# Patient Record
Sex: FEMALE | Race: WHITE | NOT HISPANIC OR LATINO | Employment: FULL TIME | URBAN - METROPOLITAN AREA
[De-identification: names, ages, dates, MRNs, and addresses within clinical notes are randomized per-mention and may not be internally consistent; named-entity substitution may affect disease eponyms.]

---

## 2022-05-27 ENCOUNTER — TELEPHONE (OUTPATIENT)
Dept: GENETICS | Facility: CLINIC | Age: 47
End: 2022-05-27
Payer: COMMERCIAL

## 2022-05-27 NOTE — TELEPHONE ENCOUNTER
Spoke to pt, scheduled appt with GC. Pt confirmed understanding of time, date and address of clinic

## 2022-06-15 ENCOUNTER — TELEPHONE (OUTPATIENT)
Dept: GENETICS | Facility: CLINIC | Age: 47
End: 2022-06-15
Payer: COMMERCIAL

## 2022-06-16 ENCOUNTER — OFFICE VISIT (OUTPATIENT)
Dept: GENETICS | Facility: CLINIC | Age: 47
End: 2022-06-16
Payer: COMMERCIAL

## 2022-06-16 ENCOUNTER — LAB VISIT (OUTPATIENT)
Dept: LAB | Facility: HOSPITAL | Age: 47
End: 2022-06-16
Attending: MEDICAL GENETICS
Payer: COMMERCIAL

## 2022-06-16 VITALS — WEIGHT: 177.81 LBS | HEIGHT: 67 IN | BODY MASS INDEX: 27.91 KG/M2

## 2022-06-16 DIAGNOSIS — Z82.79 FAMILY HISTORY OF CONGENITAL OR GENETIC CONDITION: Primary | ICD-10-CM

## 2022-06-16 DIAGNOSIS — Z82.79 FAMILY HISTORY OF CONGENITAL OR GENETIC CONDITION: ICD-10-CM

## 2022-06-16 PROCEDURE — 99499 NO LOS: ICD-10-PCS | Mod: S$GLB,,, | Performed by: MEDICAL GENETICS

## 2022-06-16 PROCEDURE — 96040 PR GENETIC COUNSELING, EACH 30 MIN: ICD-10-PCS | Mod: S$GLB,,, | Performed by: MEDICAL GENETICS

## 2022-06-16 PROCEDURE — 99999 PR PBB SHADOW E&M-EST. PATIENT-LVL III: CPT | Mod: PBBFAC,,,

## 2022-06-16 PROCEDURE — 36415 COLL VENOUS BLD VENIPUNCTURE: CPT | Performed by: MEDICAL GENETICS

## 2022-06-16 PROCEDURE — 99999 PR PBB SHADOW E&M-EST. PATIENT-LVL III: ICD-10-PCS | Mod: PBBFAC,,,

## 2022-06-16 PROCEDURE — 96040 PR GENETIC COUNSELING, EACH 30 MIN: CPT | Mod: S$GLB,,, | Performed by: MEDICAL GENETICS

## 2022-06-16 PROCEDURE — 99499 UNLISTED E&M SERVICE: CPT | Mod: S$GLB,,, | Performed by: MEDICAL GENETICS

## 2022-06-16 RX ORDER — SIMVASTATIN 10 MG/1
TABLET, FILM COATED ORAL
COMMUNITY
Start: 2022-04-19

## 2022-06-16 RX ORDER — DEXTROAMPHETAMINE SULFATE, DEXTROAMPHETAMINE SACCHARATE, AMPHETAMINE ASPARTATE MONOHYDRATE, AND AMPHETAMINE SULFATE 6.25; 6.25; 6.25; 6.25 MG/1; MG/1; MG/1; MG/1
1 CAPSULE, EXTENDED RELEASE ORAL DAILY
COMMUNITY
Start: 2022-05-30

## 2022-06-16 NOTE — PROGRESS NOTES
Sarah Watson   DOS: 2022  : 1975   MRN: [id number]     REFERRING MD: Self-referral     REASON FOR CONSULT: Our [facility] Service was asked to provide genetic counseling for this 46-year-old female with a family history of ALS and a pathogenic variant in [id number]. She presents with her  for todays visit.     HISTORY OF PRESENT ILLNESS: Ms. Watson is a 46-year-old female with no symptoms of ALS or frontotemporal dementia. Her sister, Lita is known to our service. 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     FAMILY HISTORY:   Ms. Watson has two sons ages 13 and 7 and a 17-year-old daughter. Her sister, Lita, has ALS. She is 51. Her mother is 80 and has no symptoms. Her father is 82 and may have early signs of dementia but this is unclear. Her paternal cousin had ALS and  in his 60s.         IMPRESSION: Ms. Watson is a 46-year-old female with a family history of ALS and a pathogenic variant in J9jgm14 for which she is at risk. We discussed that pathogenic repeat expansions in Q3nos06 are associated with frontotemporal dementia (FTD) and/or amyotrophic lateral sclerosis (ALS). Age of onset is usually between 50 and 64 years and phenotype, which may be pure FTD, pure ALS, or a combination, is highly variable, even within the same family. It is expected to be fully penetrant, but age of onset is unpredictable.     We discussed motivations for testing. She would like to know to know implications for her children and to plan for the future. She would also be interested in clinical trials if they became available. We discussed that while most cases of ALS related to Y5mta31 are inherited from an affected parent, her parents are both in their 80s. It is unclear if her father's early dementia is age-related or due to [id number]-bvwpdfw FTD. It is possible that Lita's mutation could be de pal or expanded due  to anticipation, but without testing her parents, this would be unclear.     We reviewed the implications of CATH[place] which protects from discrimination from health insurance companies and employers but not from life insurance or long-term disability. She consented to testing. I offered to arrange for counseling or to speak with a  if desired but this was declined. We discussed that generally we like to disclose results in person but a virtual visit would be OK in this case since she lives far away. She plans to have her  with her. We will arrange for follow-up once results are received.      RECOMMENDATIONS:  1. T8vcv05 repeat analysis to [facility]  2. Follow-up once results are returned     TIME SPENT: 30 minutes     Cait Bernal, MPH, MS, [facility]  Genetic Counselor   [facility]    Nohemy Branham M.D.                                                                                   Medical Geneticist                                                                                                               [facility]                                                                                                   Detail Level: Detailed Detail Level: Generalized Detail Level: Zone

## 2022-07-19 ENCOUNTER — TELEPHONE (OUTPATIENT)
Dept: GENETICS | Facility: CLINIC | Age: 47
End: 2022-07-19
Payer: COMMERCIAL

## 2022-07-19 ENCOUNTER — PATIENT MESSAGE (OUTPATIENT)
Dept: GENETICS | Facility: CLINIC | Age: 47
End: 2022-07-19
Payer: COMMERCIAL

## 2022-07-19 NOTE — TELEPHONE ENCOUNTER
----- Message from Cait Bernal CGC sent at 7/19/2022  1:14 PM CDT -----  Regarding: Results  Hey do you mind scheduling results with me for one day next week when I'm back. Monday would be fine. Virtual is OK if they're still comfortable with that. Thanks!

## 2022-07-22 ENCOUNTER — TELEPHONE (OUTPATIENT)
Dept: GENETICS | Facility: CLINIC | Age: 47
End: 2022-07-22
Payer: COMMERCIAL

## 2022-07-25 ENCOUNTER — PATIENT MESSAGE (OUTPATIENT)
Dept: GENETICS | Facility: CLINIC | Age: 47
End: 2022-07-25

## 2022-07-25 ENCOUNTER — OFFICE VISIT (OUTPATIENT)
Dept: GENETICS | Facility: CLINIC | Age: 47
End: 2022-07-25
Payer: COMMERCIAL

## 2022-07-25 DIAGNOSIS — F02.80 C9ORF72-RELATED AMYOTROPHIC LATERAL SCLEROSIS AND FRONTOTEMPORAL DEMENTIA: Primary | ICD-10-CM

## 2022-07-25 DIAGNOSIS — G31.09 C9ORF72-RELATED AMYOTROPHIC LATERAL SCLEROSIS AND FRONTOTEMPORAL DEMENTIA: Primary | ICD-10-CM

## 2022-07-25 DIAGNOSIS — G12.21 C9ORF72-RELATED AMYOTROPHIC LATERAL SCLEROSIS AND FRONTOTEMPORAL DEMENTIA: Primary | ICD-10-CM

## 2022-07-25 PROCEDURE — 99499 NO LOS: ICD-10-PCS | Mod: 95,,, | Performed by: MEDICAL GENETICS

## 2022-07-25 PROCEDURE — 99499 UNLISTED E&M SERVICE: CPT | Mod: 95,,, | Performed by: MEDICAL GENETICS

## 2022-07-25 NOTE — PROGRESS NOTES
Sarah Watson   DOS: 2022  : 1975    MRN: [id number]     TELEMEDICINE VIDEO VISIT     The patient location is: [facility]  The chief complaint leading to consultation is: Results   Total time spent with patient:   Visit type: Virtual visit with synchronous audio and video      Each patient to whom he or she provides medical services by telemedicine is: (1) informed of the relationship between the physician and patient and the respective role of any other health care provider with respect to management of the patient; and (2) notified that he or she may decline to receive medical services by telemedicine and may withdraw from such care at any time.    HISTORY OF PRESENT ILLNESS: I have seen this 46-year-old female with no symptoms of ALS or frontotemporal dementia. Her sister, Lita is known to our service. She has ALS and underwent genetic testing which revealed a pathogenic full mutation in T8jgh03 (>30, 5 G4C2 (GGGGCC) repeats). She underwent genetic testing which confirmed she also harbors a pathogenic full mutation in R4kyh25. She returns today with her  for results disclosure.      GENETIC TESTING:       FAMILY HISTORY:  Ms. Watson has two sons ages 13 and 7 and a 17-year-old daughter. Her sister, Lita, has ALS. She is 51. Her mother is 80 and has no symptoms. Her father is 82 and may have early signs of dementia but this is unclear. Her paternal cousin had ALS and  in his 60s.    IMPRESSION: Ms. Watson is a 46-year-old asymptomatic female with a family history of ALS in her sister. 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   Pathogenic repeat expansions in P3rtn12 are associated with frontotemporal dementia (FTD) and/or amyotrophic lateral sclerosis (ALS). Age of onset is usually between 50 and 64 years and phenotype, which may be pure FTD, pure ALS, or a combination, is highly variable, even within the same family.  It is expected to be fully penetrant, but age of onset is unpredictable. There is currently no cure, and treatment is best provided by a multidisciplinary team. There are several ongoing clinical trials, however.     Ms. Riggs children are at 50% risk to inherit the variant and can have genetic testing when they are adults if desired. I will send information about clinical trials to Ms. Watson today. Given the news, I plan to follow-up with the family later this week or early next week.     RECOMMENDATIONS:  1. Referral to neurology  2. Will send clinical trials to patient portal  3. Follow-up in genetics as needed     TIME SPENT: 15 minutes     Cait Bernal, MPH, MS, [facility]  Genetic Counselor   [facility]    Nohemy Branham M.D.                                                                                   Medical Geneticist                                                                                                               [facility]

## 2022-07-27 LAB
GENETIC COUNSELING?: YES
GENSO SPECIMEN TYPE: NORMAL
MISCELLANEOUS GENETIC TEST NAME: NORMAL
PARTENTAL OR SIBLING TESTING?: YES
REFERENCE LAB: NORMAL
TEST RESULT: NORMAL

## 2022-08-08 ENCOUNTER — PATIENT MESSAGE (OUTPATIENT)
Dept: GENETICS | Facility: CLINIC | Age: 47
End: 2022-08-08
Payer: COMMERCIAL

## 2022-08-12 ENCOUNTER — TELEPHONE (OUTPATIENT)
Dept: NEUROLOGY | Facility: CLINIC | Age: 47
End: 2022-08-12
Payer: COMMERCIAL

## 2022-08-12 NOTE — TELEPHONE ENCOUNTER
----- Message from Linda Birmingham PA-C sent at 8/9/2022 10:35 AM CDT -----  Need to make sure this patient gets off my schedule and is either scheduled with neuromuscular or Dr. Chaves. 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

## 2022-08-31 ENCOUNTER — PATIENT MESSAGE (OUTPATIENT)
Dept: NEUROLOGY | Facility: CLINIC | Age: 47
End: 2022-08-31
Payer: COMMERCIAL